# Patient Record
Sex: FEMALE
[De-identification: names, ages, dates, MRNs, and addresses within clinical notes are randomized per-mention and may not be internally consistent; named-entity substitution may affect disease eponyms.]

---

## 2019-07-30 PROBLEM — Z00.00 ENCOUNTER FOR PREVENTIVE HEALTH EXAMINATION: Status: ACTIVE | Noted: 2019-07-30

## 2019-09-17 ENCOUNTER — APPOINTMENT (OUTPATIENT)
Dept: UROLOGY | Facility: CLINIC | Age: 59
End: 2019-09-17

## 2020-04-26 ENCOUNTER — MESSAGE (OUTPATIENT)
Age: 60
End: 2020-04-26

## 2021-11-11 ENCOUNTER — APPOINTMENT (OUTPATIENT)
Dept: UROLOGY | Facility: CLINIC | Age: 61
End: 2021-11-11

## 2021-11-23 ENCOUNTER — APPOINTMENT (OUTPATIENT)
Dept: UROLOGY | Facility: CLINIC | Age: 61
End: 2021-11-23

## 2021-12-02 ENCOUNTER — APPOINTMENT (OUTPATIENT)
Dept: UROLOGY | Facility: CLINIC | Age: 61
End: 2021-12-02
Payer: COMMERCIAL

## 2021-12-02 DIAGNOSIS — R10.2 PELVIC AND PERINEAL PAIN: ICD-10-CM

## 2021-12-02 DIAGNOSIS — R39.15 URGENCY OF URINATION: ICD-10-CM

## 2021-12-02 DIAGNOSIS — N28.9 DISORDER OF KIDNEY AND URETER, UNSPECIFIED: ICD-10-CM

## 2021-12-02 PROCEDURE — 99203 OFFICE O/P NEW LOW 30 MIN: CPT

## 2021-12-09 ENCOUNTER — APPOINTMENT (OUTPATIENT)
Dept: UROLOGY | Facility: CLINIC | Age: 61
End: 2021-12-09

## 2021-12-12 LAB
APPEARANCE: CLEAR
BACTERIA UR CULT: NORMAL
BACTERIA: NEGATIVE
BILIRUBIN URINE: NEGATIVE
BLOOD URINE: NEGATIVE
COLOR: COLORLESS
GLUCOSE QUALITATIVE U: NEGATIVE
HYALINE CASTS: 0 /LPF
KETONES URINE: NEGATIVE
LEUKOCYTE ESTERASE URINE: NEGATIVE
MICROSCOPIC-UA: NORMAL
NITRITE URINE: NEGATIVE
PH URINE: 7
PROTEIN URINE: NEGATIVE
RED BLOOD CELLS URINE: 0 /HPF
SPECIFIC GRAVITY URINE: 1.01
SQUAMOUS EPITHELIAL CELLS: 0 /HPF
URINE CYTOLOGY: NORMAL
UROBILINOGEN URINE: NORMAL
WHITE BLOOD CELLS URINE: 0 /HPF

## 2021-12-14 PROBLEM — N28.9 KIDNEY PROBLEM: Status: ACTIVE | Noted: 2021-12-14

## 2021-12-14 PROBLEM — R10.2 SUPRAPUBIC PAIN: Status: ACTIVE | Noted: 2021-12-14

## 2021-12-14 NOTE — LETTER HEADER
[FreeTextEntry3] : Landon Chery MD, PhD\par [address], [address]\[place] [address]

## 2021-12-14 NOTE — LETTER BODY
[Dear  ___] : Dear  [unfilled], [Consult Letter:] : I had the pleasure of evaluating your patient, [unfilled]. [Please see my note below.] : Please see my note below. [Consult Closing:] : Thank you very much for allowing me to participate in the care of this patient.  If you have any questions, please do not hesitate to contact me. [Sincerely,] : Sincerely, [FreeTextEntry2] : Dr. Linus Thompson\par [address] [facility] [address]\[place] [address] [FreeTextEntry1] : 12/02/2021: Ms. Cervantes is a 60y/o F presents today for evaluation of kidney issue. Referred from PCP. Patient today complains of vaginal urethral burning and itching which happens constantly whether or not she urinates, and improves a little while urinating. Does have urinary urgency with some incontinence. Wakes 4-5x at night to urinate. No hx of UTI. Had first Moderna COVID shot on 6/23/21, second on 7/21/21. Intends to get booster in 6 months. Works as nursing assistant. \par \par Due to burning sensation in vaginal urethral retropubic space, will have cystoscopy done next week. \par \par Due to referral to urology for kidney issue, patient should go for CT urogram. \par \par The patient produced a urine sample which will be sent for urinalysis, urine cytology, and urine culture. \par \par RTO for cystoscopy and review CT scan.  [FreeTextEntry3] : Landon Chery MD, PhD

## 2021-12-14 NOTE — ADDENDUM
[FreeTextEntry1] : I, Lisa Miguelin, acted solely as a scribe for Dr. Landon Chery on this date 12/02/2021.\par \par All medical record entries made by the Scribe were at my, Dr. Landon Chery, direction and personally dictated by me on 12/02/2021. I have reviewed the chart and agree that the record accurately reflects my personal performance of the history, physical exam, assessment and plan.  I have also personally directed, reviewed and agreed with the chart.

## 2021-12-14 NOTE — ASSESSMENT
[FreeTextEntry1] : 12/02/2021: Ms. Cervantes is a 60y/o F presents today for evaluation of kidney issue. Referred from PCP. Patient today complains of vaginal urethral burning and itching which happens constantly whether or not she urinates, and improves a little while urinating. Does have urinary urgency with some incontinence. Wakes 4-5x at night to urinate. No hx of UTI. Had first Moderna COVID shot on 6/23/21, second on 7/21/21. Intends to get booster in 6 months. Works as nursing assistant. \par \par Due to burning sensation in vaginal urethral retropubic space, will have cystoscopy done next week. \par \par Due to referral to urology for kidney issue, patient should go for CT urogram. \par \par The patient produced a urine sample which will be sent for urinalysis, urine cytology, and urine culture. \par \par RTO for cystoscopy and review CT scan. \par \par Preparation, in-person office visit, and coordination of care took: 30 minutes

## 2021-12-14 NOTE — HISTORY OF PRESENT ILLNESS
[FreeTextEntry1] : 12/02/2021: Ms. Cervantes is a 62y/o F presents today for evaluation of kidney issue. Referred from PCP. Patient today complains of vaginal urethral burning and itching which happens constantly whether or not she urinates, and improves a little while urinating. Does have urinary urgency with some incontinence. Wakes 4-5x at night to urinate. No hx of UTI. Had first Moderna COVID shot on 6/23/21, second on 7/21/21. Intends to get booster in 6 months. Works as nursing assistant.

## 2021-12-27 ENCOUNTER — NON-APPOINTMENT (OUTPATIENT)
Age: 61
End: 2021-12-27

## 2022-01-02 ENCOUNTER — APPOINTMENT (OUTPATIENT)
Dept: CT IMAGING | Facility: IMAGING CENTER | Age: 62
End: 2022-01-02